# Patient Record
Sex: FEMALE | Race: ASIAN | NOT HISPANIC OR LATINO | URBAN - METROPOLITAN AREA
[De-identification: names, ages, dates, MRNs, and addresses within clinical notes are randomized per-mention and may not be internally consistent; named-entity substitution may affect disease eponyms.]

---

## 2024-03-10 ENCOUNTER — EMERGENCY (EMERGENCY)
Facility: HOSPITAL | Age: 24
LOS: 1 days | Discharge: HOME | End: 2024-03-10
Attending: STUDENT IN AN ORGANIZED HEALTH CARE EDUCATION/TRAINING PROGRAM
Payer: MEDICAID

## 2024-03-10 VITALS
HEART RATE: 86 BPM | OXYGEN SATURATION: 97 % | TEMPERATURE: 98 F | WEIGHT: 240.08 LBS | HEIGHT: 70 IN | SYSTOLIC BLOOD PRESSURE: 128 MMHG | RESPIRATION RATE: 16 BRPM | DIASTOLIC BLOOD PRESSURE: 79 MMHG

## 2024-03-10 VITALS
OXYGEN SATURATION: 96 % | HEART RATE: 77 BPM | SYSTOLIC BLOOD PRESSURE: 118 MMHG | RESPIRATION RATE: 18 BRPM | TEMPERATURE: 98 F | DIASTOLIC BLOOD PRESSURE: 75 MMHG

## 2024-03-10 LAB — HCG UR QL: NEGATIVE — SIGNIFICANT CHANGE UP

## 2024-03-10 PROCEDURE — 81025 URINE PREGNANCY TEST: CPT

## 2024-03-10 PROCEDURE — 73630 X-RAY EXAM OF FOOT: CPT

## 2024-03-10 PROCEDURE — 93010 ELECTROCARDIOGRAM REPORT: CPT

## 2024-03-10 PROCEDURE — 73630 X-RAY EXAM OF FOOT: CPT | Mod: 26,RT

## 2024-03-10 PROCEDURE — 99284 EMERGENCY DEPT VISIT MOD MDM: CPT

## 2024-03-10 PROCEDURE — 93005 ELECTROCARDIOGRAM TRACING: CPT

## 2024-03-10 PROCEDURE — 99285 EMERGENCY DEPT VISIT HI MDM: CPT | Mod: 25

## 2024-03-10 NOTE — ED PROVIDER NOTE - PATIENT PORTAL LINK FT
You can access the FollowMyHealth Patient Portal offered by [facility] by registering at the following website: [email or web address]. By joining [facility]’s FollowMyHealth portal, you will also be able to view your health information using other applications (apps) compatible with our system.

## 2024-03-10 NOTE — ED ADULT NURSE NOTE - NSFALLUNIVINTERV_ED_ALL_ED
Bed/Stretcher in lowest position, wheels locked, appropriate side rails in place/Call bell, personal items and telephone in reach/Instruct patient to call for assistance before getting out of bed/chair/stretcher/Non-slip footwear applied when patient is off stretcher/[place] to call system/Physically safe environment - no spills, clutter or unnecessary equipment/Purposeful proactive rounding/Room/bathroom lighting operational, light cord in reach

## 2024-03-10 NOTE — ED PROVIDER NOTE - OBJECTIVE STATEMENT
23-year-old female with no prior past medical history presents with right foot pain since yesterday.  Patient states she has been having intermittent dizziness and standing over the past few months, states she had episode yesterday where she stood up, felt lightheaded, and felt like she was going to pass out.  Patient states she tripped and hyperflexed her first and second digits of the right foot.  Denies fainting.  Denies any head strike or fall.  Patient states she followed up with her doctor yesterday and had labs performed.  Patient denies any fevers, chest pain, shortness of breath, dizziness, lightheadedness, abdominal pain, vomiting, vaginal bleeding, bloody stools or black tarry stools, numbness, focal weakness, rash.  Patient ambulatory without assistance.  Denies any additional complaints.

## 2024-03-10 NOTE — ED PROVIDER NOTE - PROGRESS NOTE DETAILS
Gail-DO: pt seen and re-evaluated at bedside. Pt comfortable in NAD. Gail-: pt seen and re-evaluated at bedside. Pt comfortable in NAD. XR neg per my wet read. Offered splint/crutches, pt declined. Will DC with outpatient follow up/return precautions.

## 2024-03-10 NOTE — ED ADULT NURSE NOTE - OBJECTIVE STATEMENT
Patient presents to ED with c/o right great toe pain since yesterday. Patient reports she has been having intermittent dizziness, she had an episode yesterday, she got out of bed, stood up, felt lightheaded, and felt like she was going to pass out, and her right great toe went under her foot.

## 2024-03-10 NOTE — ED PROVIDER NOTE - PHYSICAL EXAMINATION
CONSTITUTIONAL: non-toxic, well appearing  SKIN: no rash, no petechiae.  EYES: pink conjunctiva, anicteric  NECK: Supple; no meningismus, no JVD  CARD: RRR, no murmurs, equal radial pulses bilaterally 2+  RESP: CTAB, no respiratory distress  ABD: Soft, non-tender, non-distended, no peritoneal signs, no CVA tenderness  EXT: Normal ROM x4. No edema. Right  over 1st and 2nd proximal phalanges, nails intact, FROM toes, ecchymosis over 1st digit, no subungual hematoma  NEURO: Alert, oriented. Neuro exam nonfocal  PSYCH: Cooperative, appropriate.

## 2024-03-10 NOTE — ED PROVIDER NOTE - CLINICAL SUMMARY MEDICAL DECISION MAKING FREE TEXT BOX
Anamika: 23-year-old female with no prior past medical history presents with right foot pain since yesterday.  Patient states she has been having intermittent dizziness and standing over the past few months, states she had episode yesterday where she stood up, felt lightheaded, and felt like she was going to pass out.  Patient states she tripped and hyperflexed her first and second digits of the right foot.  Denies fainting.  Denies any head strike or fall.  Patient states she followed up with her doctor yesterday and had labs performed.  Patient denies any fevers, chest pain, shortness of breath, dizziness, lightheadedness, abdominal pain, vomiting, vaginal bleeding, bloody stools or black tarry stools, numbness, focal weakness, rash.  Patient ambulatory without assistance.  Physical exam per above. No evidence of a cardiac arrythmia such as Brugada, WPW, HOCM, long or short QT.  Neurologic exam is nonfocal, not c/w CVA or primary neurologic abnormality. Patient declines analgesia at this time. Will obtain labs r/o pregnancy, imaging r/o fx with dispo pending workup.

## 2024-03-10 NOTE — ED PROVIDER NOTE - NSFOLLOWUPCLINICS_GEN_ALL_ED_FT
[facility]  Podiatry/Wound [facility]  [address] [place] [address]  Phone: [phone]  Fax: [phone]